# Patient Record
Sex: MALE | NOT HISPANIC OR LATINO | Employment: FULL TIME | URBAN - METROPOLITAN AREA
[De-identification: names, ages, dates, MRNs, and addresses within clinical notes are randomized per-mention and may not be internally consistent; named-entity substitution may affect disease eponyms.]

---

## 2018-04-23 ENCOUNTER — TRANSCRIBE ORDERS (OUTPATIENT)
Dept: NEUROLOGY | Facility: CLINIC | Age: 53
End: 2018-04-23

## 2018-04-23 DIAGNOSIS — G47.30 SLEEP APNEA, UNSPECIFIED TYPE: Primary | ICD-10-CM

## 2018-06-14 ENCOUNTER — OFFICE VISIT (OUTPATIENT)
Dept: NEUROLOGY | Facility: CLINIC | Age: 53
End: 2018-06-14
Payer: COMMERCIAL

## 2018-06-14 VITALS
WEIGHT: 222 LBS | BODY MASS INDEX: 30.07 KG/M2 | DIASTOLIC BLOOD PRESSURE: 82 MMHG | SYSTOLIC BLOOD PRESSURE: 122 MMHG | HEART RATE: 72 BPM | HEIGHT: 72 IN

## 2018-06-14 DIAGNOSIS — G47.33 OSA (OBSTRUCTIVE SLEEP APNEA): Primary | ICD-10-CM

## 2018-06-14 PROCEDURE — 99214 OFFICE O/P EST MOD 30 MIN: CPT | Performed by: PSYCHIATRY & NEUROLOGY

## 2018-06-14 RX ORDER — CETIRIZINE HYDROCHLORIDE 10 MG/1
10 TABLET ORAL EVERY MORNING
COMMUNITY

## 2018-06-14 RX ORDER — MONTELUKAST SODIUM 10 MG/1
10 TABLET ORAL DAILY PRN
COMMUNITY
Start: 2018-04-01

## 2018-06-14 RX ORDER — FOLIC ACID 1 MG/1
1 TABLET ORAL DAILY
COMMUNITY
End: 2020-09-18 | Stop reason: ALTCHOICE

## 2018-06-14 RX ORDER — CYANOCOBALAMIN (VITAMIN B-12) 5000 MCG
2 TABLET,DISINTEGRATING ORAL DAILY
COMMUNITY
End: 2019-06-13 | Stop reason: ALTCHOICE

## 2018-06-14 NOTE — PROGRESS NOTES
Elli Early is a 47 y o  male  Chief Complaint   Patient presents with    Sleep Apnea       Assessment:  1  TASHA (obstructive sleep apnea)        Plan:    Discussion:  Patient has history of mild obstructive sleep apnea and did well on CPAP of 12 cm of water pressure, his last sleep study was in 2014, he has not seen me in a few years, according to the patient he got a new machine and is on auto CPAP, he did get his download which is not very clear and does not tell me his pressures and also his RDI seems to be greater than 5 most of the time, I have advised him to take the machine to young and make sure that his pressures are correctly set, we will fax his old sleep study results to [facility], Patient was advised to continue with current CPAP pressure, he was also advised to maintain ideal body weight, avoid alcohol, muscle relaxers, pain killers and sleeping aids, he was advised to preferably sleep in lateral recumbent position, avoid driving and operating machinery if feeling drowsy or tired, clean the CPAP machine as per the [id number] company instructions, go to the hospital if has any worsening symptoms and call me  And to follow up with his other physicians  Subjective:    HPI   Patient is here in follow-up for his history of mild obstructive sleep apnea, he has not seen me in many years and his last sleep study in 2014 showed that he had mild obstructive sleep apnea and did well on CPAP of 12 cm of water pressure, according to the patient he has been on an auto CPAP he got his new CPAP machine in [place] and has been using it every night, he usually goes to sleep around 10:00 p m  and gets up in the morning at 5:00 a m  and has no difficulty in going to sleep or staying asleep, no drowsiness while driving, ESS is 3, weight has been stable, according to the patient he has been diagnosed with neuropathy, no other complaints      Vitals:    06/14/18 1526   BP: 122/82   BP Location: Left arm   Patient Position: Sitting   Cuff Size: Large   Pulse: 72   Weight: 101 kg (222 lb)   Height: 6' (1 829 m)       Current Medications    Current Outpatient Prescriptions:     cetirizine (ZyrTEC) 10 mg tablet, Take 10 mg by mouth every morning, Disp: , Rfl:     Cyanocobalamin (VITAMIN B-12) 5000 MCG TBDP, Take 2 tablets by mouth daily, Disp: , Rfl:     folic acid (FOLVITE) 1 mg tablet, Take 1 mg by mouth daily, Disp: , Rfl:     montelukast (SINGULAIR) 10 mg tablet, , Disp: , Rfl:     Multiple Vitamins-Minerals (MENS 50+ MULTI VITAMIN/MIN) TABS, Take by mouth, Disp: , Rfl:       Allergies  Amoxicillin-pot clavulanate; Hypericum perforatum; St johns wort [hypericum perforatum]; and Sulfacetamide    Past Medical History  Past Medical History:   Diagnosis Date    Anemia     CTS (carpal tunnel syndrome)     Hepatitis A infection     Herpes genitalis in men     Metatarsal fracture     Obstructive sleep apnea     Polyneuropathy     Vitamin B deficiency          Past Surgical History:  Past Surgical History:   Procedure Laterality Date    TOOTH EXTRACTION           Family History:  Family History   Problem Relation Age of Onset    Other Father         Benign Brain Tumor    Lung cancer Father     Diabetes Maternal Uncle     Heart murmur Mother     Pneumonia Mother        Social History:   reports that he has never smoked  He has never used smokeless tobacco  He reports that he drinks alcohol  He reports that he does not use drugs  I have reviewed the past medical history, surgical history, social and family history, current medications, allergies vitals, review of systems, and updated this information as appropriate today  Objective:    Physical Exam    Neurological Exam    GENERAL:  Cooperative in no acute distress  Well-developed and well-nourished    HEAD and NECK   Head is atraumatic normocephalic with no lesions or masses   Neck is supple with full range of motion    CARDIOVASCULAR  Carotid Arteries-no carotid bruits  NEUROLOGIC:  Mental Status-the patient is awake alert and oriented without aphasia or apraxia  Cranial Nerves: Visual fields are full to confrontation  Funduscopic examination could not be done Extraocular movements are full without nystagmus  Pupils are 2-1/2 mm and reactive  Face is symmetrical to light touch  Movements of facial expression move symmetrically  Hearing is normal to finger rub bilaterally  Soft palate lifts symmetrically  Shoulder shrug is symmetrical  Tongue is midline without atrophy  Motor: No drift is noted on arm extension  Strength is full in the upper and lower extremities with normal bulk and tone  Gait is unremarkable  Oropharynx classification is 2/3          ROS:  Review of Systems   Constitutional: Positive for fatigue  Negative for appetite change and fever  HENT: Negative  Negative for hearing loss, tinnitus, trouble swallowing and voice change  Eyes: Negative  Negative for photophobia and pain  Respiratory: Positive for shortness of breath  Cardiovascular: Positive for chest pain  Negative for palpitations  Gastrointestinal: Negative  Negative for nausea and vomiting  Endocrine: Negative  Negative for cold intolerance and heat intolerance  Genitourinary: Negative  Negative for dysuria, frequency and urgency  Musculoskeletal: Negative  Negative for back pain, gait problem, myalgias and neck pain  Muscle twitching of hands   Skin: Negative  Negative for rash  Neurological: Positive for numbness  Negative for dizziness, tremors, seizures, syncope, facial asymmetry, speech difficulty, weakness, light-headedness and headaches  Hematological: Negative  Does not bruise/bleed easily  Psychiatric/Behavioral: Negative  Negative for confusion, hallucinations and sleep disturbance         ESS = 7

## 2018-09-12 ENCOUNTER — TELEPHONE (OUTPATIENT)
Dept: NEUROLOGY | Facility: CLINIC | Age: 53
End: 2018-09-12

## 2018-09-12 DIAGNOSIS — G47.33 OSA (OBSTRUCTIVE SLEEP APNEA): Primary | ICD-10-CM

## 2018-09-12 NOTE — TELEPHONE ENCOUNTER
LMOM to let patient know that CPAP Rx has been sent to [facility]  for adjustment

## 2018-11-28 ENCOUNTER — TELEPHONE (OUTPATIENT)
Dept: NEUROLOGY | Facility: CLINIC | Age: 53
End: 2018-11-28

## 2018-11-28 NOTE — TELEPHONE ENCOUNTER
Called patient to see if he wanted to be seen sooner by Dr Alfreda Pastor  There is an opening today at 2 and an appointment tomorrow at 1130  No answer  Left voicemail

## 2019-03-15 ENCOUNTER — TELEPHONE (OUTPATIENT)
Dept: NEUROLOGY | Facility: CLINIC | Age: 54
End: 2019-03-15

## 2019-03-15 NOTE — TELEPHONE ENCOUNTER
Called patient to see if he would be available to come in next week to see Dr Christiano Marie sooner  Patient is on wait list  Patient is unable to come in next week and needs either the first appointment or last appointment

## 2019-03-27 ENCOUNTER — TELEPHONE (OUTPATIENT)
Dept: NEUROLOGY | Facility: CLINIC | Age: 54
End: 2019-03-27

## 2019-03-27 NOTE — TELEPHONE ENCOUNTER
Spoke to patient to see if he would be available to come in sooner to see Dr Dorita Ruvalcaba  Patient stated he wanted to keep the appointment for June instead of coming in sooner  Patient also stated that he will have new insurance when he comes in June

## 2019-06-12 ENCOUNTER — TELEPHONE (OUTPATIENT)
Dept: NEUROLOGY | Facility: CLINIC | Age: 54
End: 2019-06-12

## 2019-06-13 ENCOUNTER — OFFICE VISIT (OUTPATIENT)
Dept: NEUROLOGY | Facility: CLINIC | Age: 54
End: 2019-06-13
Payer: COMMERCIAL

## 2019-06-13 VITALS
HEIGHT: 72 IN | DIASTOLIC BLOOD PRESSURE: 80 MMHG | SYSTOLIC BLOOD PRESSURE: 118 MMHG | HEART RATE: 80 BPM | WEIGHT: 218.4 LBS | BODY MASS INDEX: 29.58 KG/M2

## 2019-06-13 DIAGNOSIS — G47.33 OSA (OBSTRUCTIVE SLEEP APNEA): Primary | ICD-10-CM

## 2019-06-13 PROCEDURE — 99213 OFFICE O/P EST LOW 20 MIN: CPT | Performed by: PSYCHIATRY & NEUROLOGY

## 2019-06-14 ENCOUNTER — TELEPHONE (OUTPATIENT)
Dept: NEUROLOGY | Facility: CLINIC | Age: 54
End: 2019-06-14

## 2020-03-06 ENCOUNTER — TELEPHONE (OUTPATIENT)
Dept: NEUROLOGY | Facility: CLINIC | Age: 55
End: 2020-03-06

## 2020-03-09 ENCOUNTER — OFFICE VISIT (OUTPATIENT)
Dept: NEUROLOGY | Facility: CLINIC | Age: 55
End: 2020-03-09
Payer: COMMERCIAL

## 2020-03-09 VITALS
HEIGHT: 72 IN | SYSTOLIC BLOOD PRESSURE: 120 MMHG | HEART RATE: 72 BPM | BODY MASS INDEX: 29.64 KG/M2 | DIASTOLIC BLOOD PRESSURE: 84 MMHG | WEIGHT: 218.8 LBS

## 2020-03-09 DIAGNOSIS — G47.33 OSA (OBSTRUCTIVE SLEEP APNEA): Primary | ICD-10-CM

## 2020-03-09 PROCEDURE — 99213 OFFICE O/P EST LOW 20 MIN: CPT | Performed by: PSYCHIATRY & NEUROLOGY

## 2020-03-09 RX ORDER — UBIDECARENONE 100 MG
100 CAPSULE ORAL 4 TIMES DAILY
COMMUNITY

## 2020-03-09 RX ORDER — MAGNESIUM OXIDE/MAG AA CHELATE 300 MG
CAPSULE ORAL DAILY
COMMUNITY

## 2020-03-09 NOTE — PROGRESS NOTES
David Plascencia is a 48 y o  male  Chief Complaint   Patient presents with    Sleep Apnea     Patient requesting new mask       Assessment:  1  TASHA (obstructive sleep apnea)          Discussion:  Patient's recent CPAP download was reviewed, he is on auto CPAP, his RDI was 15 6 with a compliance greater than 4 hours at 31 7% and average use of 2 hours and 41 minutes, he is having issues with his mask, patient was given a prescription for a new mask, also discussed with the patient that he may benefit from a repeat sleep study since his RDI is still elevated on auto CPAP, he might benefit from a BiPAP, he will try the new mask and see how he does and still RDI is elevated then he was scheduled for a repeat sleep study, Patient was advised to continue with current CPAP pressure, he was also advised to maintain ideal body weight, avoid alcohol, muscle relaxers, pain killers and sleeping aids, he was advised to preferably sleep in lateral recumbent position, avoid driving and operating machinery if feeling drowsy or tired, clean the CPAP machine as per the [employer] instructions, go to the hospital if has any worsening symptoms and call me  And to follow up with his other physicians  Also I am going to speak to Shannon Shepard at  [facility] and patient is going to discussed with them also  Subjective:    HPI   Patient is here in follow-up for his sleep apnea, since his last visit he is having some issues with his machine and humidity and also with the mask, he usually goes to sleep between 10 and 11:00 p m  and gets up in the morning around 5-6 a m  he might get a once or twice in the night to go to the bathroom, ESS is 4, no difficulty in going to sleep, no drowsiness while driving, he has been using the machine on average couple of hours a day, no drowsiness while driving, weight has been stable, no narcolepsy or cataplexy symptoms, no other neurological complaints      Vitals:    03/09/20 1524 03/09/20 1526   BP: 120/84 120/84   BP Location: Left arm Left arm   Patient Position: Sitting Sitting   Cuff Size: Adult Adult   Pulse: 72 72   Weight: 99 2 kg (218 lb 12 8 oz)    Height: 6' (1 829 m)        Current Medications    Current Outpatient Medications:     cetirizine (ZyrTEC) 10 mg tablet, Take 10 mg by mouth every morning, Disp: , Rfl:     co-enzyme Q-10 30 MG capsule, Take 100 mg by mouth 4 (four) times a day, Disp: , Rfl:     Cyanocobalamin 5000 MCG TBDP, Take 2 tablets by mouth daily, Disp: , Rfl:     Magnesium 300 MG CAPS, Take by mouth daily, Disp: , Rfl:     montelukast (SINGULAIR) 10 mg tablet, Take 10 mg by mouth daily as needed , Disp: , Rfl:     Multiple Vitamins-Minerals (MENS 50+ MULTI VITAMIN/MIN) TABS, Take by mouth, Disp: , Rfl:     doxycycline (ADOXA) 100 MG tablet, , Disp: , Rfl:     folic acid (FOLVITE) 1 mg tablet, Take 1 mg by mouth daily, Disp: , Rfl:       Allergies  Amoxicillin-pot clavulanate; Ivy's wort; St johns wort [ivy's wort]; and Sulfacetamide    Past Medical History  Past Medical History:   Diagnosis Date    Anemia     CTS (carpal tunnel syndrome)     Hepatitis A infection     Herpes genitalis in men     Metatarsal fracture     Obstructive sleep apnea     Polyneuropathy     Vitamin B deficiency          Past Surgical History:  Past Surgical History:   Procedure Laterality Date    TOOTH EXTRACTION           Family History:  Family History   Problem Relation Age of Onset    Other Father         Benign Brain Tumor    Lung cancer Father     Diabetes Maternal Uncle     Heart murmur Mother     Pneumonia Mother        Social History:   reports that he has never smoked  He has never used smokeless tobacco  He reports that he drinks alcohol  He reports that he does not use drugs      I have reviewed the past medical history, surgical history, social and family history, current medications, allergies vitals, review of systems, and updated this information as appropriate today  Objective:    Physical Exam    Neurological Exam    GENERAL:  Cooperative in no acute distress  Well-developed and well-nourished    HEAD and NECK   Head is atraumatic normocephalic with no lesions or masses  Neck is supple with full range of motion    CARDIOVASCULAR  Carotid Arteries-no carotid bruits  NEUROLOGIC:  Mental Status-the patient is awake alert and oriented without aphasia or apraxia  Cranial Nerves: Visual fields are full to confrontation  Extraocular movements are full without nystagmus  Pupils are 2-1/2 mm and reactive  Face is symmetrical to light touch  Movements of facial expression move symmetrically  Hearing is normal to finger rub bilaterally  Soft palate lifts symmetrically  Shoulder shrug is symmetrical  Tongue is midline without atrophy  Motor: No drift is noted on arm extension  Strength is full in the upper and lower extremities with normal bulk and tone  Gait is unremarkable  Oropharynx classification is 2/3  Chest is clear to auscultate bilaterally  ROS:  Review of Systems   Constitutional: Negative  HENT: Negative  Eyes: Negative  Respiratory: Positive for apnea  Waking up in the middle of the night with dry mouth  Cardiovascular: Negative  Gastrointestinal: Negative  Endocrine: Negative  Genitourinary: Negative  Musculoskeletal: Negative  Skin: Negative  Allergic/Immunologic: Negative  Neurological: Negative  Hematological: Negative  Psychiatric/Behavioral: Positive for sleep disturbance

## 2020-09-18 ENCOUNTER — OFFICE VISIT (OUTPATIENT)
Dept: NEUROLOGY | Facility: CLINIC | Age: 55
End: 2020-09-18
Payer: COMMERCIAL

## 2020-09-18 VITALS
HEART RATE: 74 BPM | HEIGHT: 72 IN | DIASTOLIC BLOOD PRESSURE: 80 MMHG | WEIGHT: 219 LBS | BODY MASS INDEX: 29.66 KG/M2 | SYSTOLIC BLOOD PRESSURE: 118 MMHG

## 2020-09-18 DIAGNOSIS — G47.33 OSA (OBSTRUCTIVE SLEEP APNEA): Primary | ICD-10-CM

## 2020-09-18 PROCEDURE — 99214 OFFICE O/P EST MOD 30 MIN: CPT | Performed by: PSYCHIATRY & NEUROLOGY

## 2020-09-18 NOTE — PROGRESS NOTES
Aldo Thomas is a 48 y o  male  Chief Complaint   Patient presents with    Sleep Apnea       Assessment:  1  TASHA (obstructive sleep apnea)        Plan:  Split night sleep study  Continue with auto CPAP until then  Follow-up in 6 months  Discussion:  Patient's recent CPAP download was reviewed, his RDI is still elevated at 15 8 on auto CPAP with a compliance greater than 4 hours at 32% an average use of 2 hours and 45 minutes, his baseline sleep study from 2014 had suggested that he had mild sleep apnea with an RDI of 6 7, have suggested to the patient on the last visit also to get a split night sleep study to make sure that if he still has sleep apnea and to what is the CPAP pressure he needs, he wanted a new prescription for CPAP mask which have given it to him  Patient was advised to continue with current CPAP pressure, he was also advised to maintain ideal body weight, avoid alcohol, muscle relaxers, pain killers and sleeping aids, he was advised to preferably sleep in lateral recumbent position, avoid driving and operating machinery if feeling drowsy or tired, clean the CPAP machine as per the [employer] company instructions, go to the hospital if has any worsening symptoms and call me  And to follow up with his other physicians  Subjective:    HPI   Patient is here in follow-up for his sleep apnea, since his last visit he has having issues with his mask, he usually goes to sleep between 10 and 11:00 p m  and gets up in the morning around 6:00 a m  to walk his dog, he has no difficulty in going to sleep he wakes of 1 or 2 times in the night to go to the bathroom, no drowsiness while driving, ESS is 3, weight has been stable, no narcolepsy or cataplexy symptoms, no other neurological complaints      Vitals:    09/18/20 0813   BP: 118/80   BP Location: Left arm   Patient Position: Sitting   Cuff Size: Large   Pulse: 74   Weight: 99 3 kg (219 lb)   Height: 6' (1 829 m)       Current Medications    Current Outpatient Medications:     cetirizine (ZyrTEC) 10 mg tablet, Take 10 mg by mouth every morning, Disp: , Rfl:     co-enzyme Q-10 100 mg capsule, Take 100 mg by mouth 4 (four) times a day , Disp: , Rfl:     Cyanocobalamin 5000 MCG CAPS, Take 2 tablets by mouth daily , Disp: , Rfl:     Magnesium 300 MG CAPS, Take by mouth daily, Disp: , Rfl:     montelukast (SINGULAIR) 10 mg tablet, Take 10 mg by mouth daily as needed , Disp: , Rfl:     Multiple Vitamins-Minerals (MENS 50+ MULTI VITAMIN/MIN) TABS, Take by mouth, Disp: , Rfl:       Allergies  Amoxicillin-pot clavulanate; Ivy's wort; St johns wort [ivy's wort]; and Sulfacetamide    Past Medical History  Past Medical History:   Diagnosis Date    Anemia     CTS (carpal tunnel syndrome)     Hepatitis A infection     Herpes genitalis in men     Metatarsal fracture     Obstructive sleep apnea     Polyneuropathy     Vitamin B deficiency          Past Surgical History:  Past Surgical History:   Procedure Laterality Date    TOOTH EXTRACTION           Family History:  Family History   Problem Relation Age of Onset    Other Father         Benign Brain Tumor    Lung cancer Father     Diabetes Maternal Uncle     Heart murmur Mother     Pneumonia Mother        Social History:   reports that he has never smoked  He has never used smokeless tobacco  He reports current alcohol use  He reports that he does not use drugs  I have reviewed the past medical history, surgical history, social and family history, current medications, allergies vitals, review of systems, and updated this information as appropriate today  Objective:    Physical Exam    Neurological Exam    GENERAL:  Cooperative in no acute distress  Well-developed and well-nourished    HEAD and NECK   Head is atraumatic normocephalic with no lesions or masses  Neck is supple with full range of motion    CARDIOVASCULAR  Carotid Arteries-no carotid bruits      NEUROLOGIC:  Mental Status-the patient is awake alert and oriented without aphasia or apraxia  Cranial Nerves: Visual fields are full to confrontation  Extraocular movements are full without nystagmus  Pupils are 2-1/2 mm and reactive  Face is symmetrical to light touch  Movements of facial expression move symmetrically  Hearing is normal to finger rub bilaterally  Soft palate lifts symmetrically  Shoulder shrug is symmetrical  Tongue is midline without atrophy  Motor: No drift is noted on arm extension  Strength is full in the upper and lower extremities with normal bulk and tone  Gait is unremarkable  Oropharynx classification is 2/3  Chest is clear to auscultate bilaterally  ROS:  Review of Systems   Constitutional: Positive for fatigue  Negative for appetite change and fever  HENT: Negative  Negative for hearing loss, tinnitus, trouble swallowing and voice change  Eyes: Negative  Negative for photophobia and pain  Respiratory: Negative  Negative for shortness of breath  Cardiovascular: Negative  Negative for palpitations  Gastrointestinal: Negative  Negative for nausea and vomiting  Endocrine: Negative  Negative for cold intolerance  Genitourinary: Negative  Negative for dysuria, frequency and urgency  Musculoskeletal: Positive for back pain  Negative for gait problem, myalgias and neck pain  Skin: Negative  Negative for rash  Neurological: Positive for numbness  Negative for dizziness, tremors, seizures, syncope, facial asymmetry, speech difficulty, weakness, light-headedness and headaches  Hematological: Negative  Does not bruise/bleed easily  Psychiatric/Behavioral: Positive for sleep disturbance  Negative for confusion, decreased concentration and hallucinations

## 2020-10-02 ENCOUNTER — TELEPHONE (OUTPATIENT)
Dept: OTHER | Facility: OTHER | Age: 55
End: 2020-10-02

## 2020-10-30 ENCOUNTER — OFFICE VISIT (OUTPATIENT)
Dept: GASTROENTEROLOGY | Facility: CLINIC | Age: 55
End: 2020-10-30
Payer: COMMERCIAL

## 2020-10-30 VITALS
WEIGHT: 219.1 LBS | TEMPERATURE: 96.8 F | SYSTOLIC BLOOD PRESSURE: 110 MMHG | HEART RATE: 76 BPM | HEIGHT: 72 IN | BODY MASS INDEX: 29.68 KG/M2 | DIASTOLIC BLOOD PRESSURE: 74 MMHG

## 2020-10-30 DIAGNOSIS — R19.4 CHANGE IN BOWEL HABITS: Primary | ICD-10-CM

## 2020-10-30 DIAGNOSIS — R14.0 BLOATING: ICD-10-CM

## 2020-10-30 PROCEDURE — 99214 OFFICE O/P EST MOD 30 MIN: CPT | Performed by: INTERNAL MEDICINE

## 2021-03-05 ENCOUNTER — TELEPHONE (OUTPATIENT)
Dept: NEUROLOGY | Facility: CLINIC | Age: 56
End: 2021-03-05

## 2021-03-05 NOTE — TELEPHONE ENCOUNTER
Spoke with patient to remind of Sleep Study that Dr Bella Bush has ordered on last office on 9/18/20  Patient wanted to reschedule appt from 3/18/21 for 2 months from now, I r/s pt for the next appt avaliable on 5/20/21

## 2021-06-16 ENCOUNTER — OFFICE VISIT (OUTPATIENT)
Dept: GASTROENTEROLOGY | Facility: CLINIC | Age: 56
End: 2021-06-16
Payer: COMMERCIAL

## 2021-06-16 VITALS
HEART RATE: 76 BPM | DIASTOLIC BLOOD PRESSURE: 76 MMHG | BODY MASS INDEX: 29.39 KG/M2 | WEIGHT: 217 LBS | HEIGHT: 72 IN | SYSTOLIC BLOOD PRESSURE: 112 MMHG

## 2021-06-16 DIAGNOSIS — R14.0 BLOATING: Primary | ICD-10-CM

## 2021-06-16 DIAGNOSIS — Z86.010 HISTORY OF COLON POLYPS: ICD-10-CM

## 2021-06-16 DIAGNOSIS — R19.4 CHANGE IN BOWEL HABITS: ICD-10-CM

## 2021-06-16 PROCEDURE — 99214 OFFICE O/P EST MOD 30 MIN: CPT | Performed by: INTERNAL MEDICINE

## 2021-06-16 RX ORDER — DOXYCYCLINE HYCLATE 100 MG
TABLET ORAL
COMMUNITY
Start: 2021-03-12

## 2021-06-16 RX ORDER — ALUMINUM ZIRCONIUM OCTACHLOROHYDREX GLY 16 G/100G
GEL TOPICAL
COMMUNITY

## 2021-06-16 NOTE — PROGRESS NOTES
[facility] - Outpatient Follow-up Note  Paradise Somers 55 y o  male MRN: [id number]  Encounter: [id number]          ASSESSMENT AND PLAN:      1  Bloating    2  Change in bowel habits    Gas handout provided and discussed  Remain on the probiotic    Trial of antigas otc products    Attempt to avoid milk and milk products     no endoscopic procedures required at this time  3  History colon polyps     due for his next colonoscopy in 2003     ______________________________________________________________________    SUBJECTIVE:   Mary Beth Comment comes to the office today for routine follow-up  He states that sometimes he has good bowel movements and other times his bowel movements are not so good  He admits to gaseousness which has been significant recently  He does wear CPAP 1-2 times per week  He states that he tried avoiding milk and this does help the problem somewhat  He had a colonoscopy performed in 2018 which showed hyperplastic polyp of rectum  States he is currently taking a probiotic which is either align or another over-the-counter medication  By far the most significant symptom is a lot of gaseousness  REVIEW OF SYSTEMS IS OTHERWISE NEGATIVE        Historical Information   Past Medical History:   Diagnosis Date    Anemia     CTS (carpal tunnel syndrome)     Hepatitis A infection     Herpes genitalis in men     Metatarsal fracture     Obstructive sleep apnea     Polyneuropathy     Vitamin B deficiency      Past Surgical History:   Procedure Laterality Date    TOOTH EXTRACTION       Social History   Social History     Substance and Sexual Activity   Alcohol Use Yes    Comment: rarely     Social History     Substance and Sexual Activity   Drug Use No     Social History     Tobacco Use   Smoking Status Never Smoker   Smokeless Tobacco Never Used     Family History   Problem Relation Age of Onset    Other Father         Benign Brain Tumor    Lung cancer Father  Diabetes Maternal Uncle     Heart murmur Mother     Pneumonia Mother        Meds/Allergies       Current Outpatient Medications:     bifidobacterium infantis (ALIGN) capsule    cetirizine (ZyrTEC) 10 mg tablet    co-enzyme Q-10 100 mg capsule    Cyanocobalamin 5000 MCG CAPS    doxycycline hyclate (VIBRA-TABS) 100 mg tablet    montelukast (SINGULAIR) 10 mg tablet    Multiple Vitamins-Minerals (MENS 50+ MULTI VITAMIN/MIN) TABS    Magnesium 300 MG CAPS    Allergies   Allergen Reactions    Amoxicillin-Pot Clavulanate Diarrhea    Kendall's Wort     St Johns Wort [Kendall's Wort]     Sulfacetamide            Objective     Blood pressure 112/76, pulse 76, height 6' (1 829 m), weight 98 4 kg (217 lb)  Body mass index is 29 43 kg/m²  PHYSICAL EXAM:      General Appearance:   Alert, cooperative, no distress   HEENT:   Normocephalic, atraumatic, anicteric      Neck:  Supple, symmetrical, trachea midline   Lungs:   Clear to auscultation bilaterally; no rales, rhonchi or wheezing; respirations unlabored    Heart[de-identified]   Regular rate and rhythm; no murmur, rub, or gallop  Abdomen:   Soft, non-tender, non-distended; normal bowel sounds; no masses, no organomegaly    Genitalia:   Deferred    Rectal:   Deferred    Extremities:  No cyanosis, clubbing or edema    Pulses:  2+ and symmetric    Skin:  No jaundice, rashes, or lesions    Lymph nodes:  No palpable cervical lymphadenopathy        Lab Results:   No visits with results within 1 Day(s) from this visit  Latest known visit with results is:   No results found for any previous visit  Radiology Results:   No results found

## 2023-10-16 ENCOUNTER — TELEPHONE (OUTPATIENT)
Age: 58
End: 2023-10-16

## 2023-10-16 NOTE — TELEPHONE ENCOUNTER
10/16/23  Screened by: Shanda Postal    Referring Provider     Pre- Screening: There is no height or weight on file to calculate BMI. Has patient been referred for a routine screening Colonoscopy? yes  Is the patient between 43-73 years old? yes      Previous Colonoscopy yes   If yes:    Date: 2018 Dr Adali Ahuja:     Reason:       SCHEDULING STAFF: If the patient is between 39yrs-51yrs, please advise patient to confirm benefits/coverage with their insurance company for a routine screening colonoscopy, some insurance carriers will only cover at [address] [place] [address] or older. If the patient is over 66years old, please schedule an office visit. Does the patient want to see a Gastroenterologist prior to their procedure OR are they having any GI symptoms? no    Has the patient been hospitalized or had abdominal surgery in the past 6 months? no    Does the patient use supplemental oxygen? no    Does the patient take Coumadin, Lovenox, Plavix, Elliquis, Xarelto, or other blood thinning medication? no    Has the patient had a stroke, cardiac event, or stent placed in the past year? no    SCHEDULING STAFF: If patient answers NO to above questions, then schedule procedure. If patient answers YES to above questions, then schedule office appointment. Patient passed OA  If patient is between 45yrs - 49yrs, please advise patient that we will have to confirm benefits & coverage with their insurance company for a routine screening colonoscopy.

## 2023-10-16 NOTE — TELEPHONE ENCOUNTER
Scheduled date of colonoscopy (as of today):01/08/2023  Physician performing colonoscopy:Dr Gr  Location of colonoscopy:[place]  Bowel prep reviewed with patient:miralax/dul  Instructions reviewed with patient by:sent via email  Clearances: n/a

## 2024-02-26 ENCOUNTER — ANESTHESIA EVENT (OUTPATIENT)
Dept: GASTROENTEROLOGY | Facility: HOSPITAL | Age: 59
End: 2024-02-26

## 2024-02-26 ENCOUNTER — ANESTHESIA (OUTPATIENT)
Dept: GASTROENTEROLOGY | Facility: HOSPITAL | Age: 59
End: 2024-02-26

## 2024-02-26 ENCOUNTER — HOSPITAL ENCOUNTER (OUTPATIENT)
Dept: GASTROENTEROLOGY | Facility: HOSPITAL | Age: 59
Setting detail: OUTPATIENT SURGERY
Discharge: HOME | End: 2024-02-26
Attending: INTERNAL MEDICINE
Payer: COMMERCIAL

## 2024-02-26 VITALS
WEIGHT: 213.19 LBS | RESPIRATION RATE: 16 BRPM | HEART RATE: 67 BPM | OXYGEN SATURATION: 98 % | HEIGHT: 72 IN | TEMPERATURE: 97.6 F | BODY MASS INDEX: 28.88 KG/M2 | SYSTOLIC BLOOD PRESSURE: 131 MMHG | DIASTOLIC BLOOD PRESSURE: 71 MMHG

## 2024-02-26 DIAGNOSIS — Z86.010 HISTORY OF COLON POLYPS: ICD-10-CM

## 2024-02-26 RX ORDER — PROPOFOL 10 MG/ML
INJECTION, EMULSION INTRAVENOUS AS NEEDED
Status: DISCONTINUED | OUTPATIENT
Start: 2024-02-26 | End: 2024-02-26

## 2024-02-26 RX ORDER — SODIUM CHLORIDE, SODIUM LACTATE, POTASSIUM CHLORIDE, CALCIUM CHLORIDE 600; 310; 30; 20 MG/100ML; MG/100ML; MG/100ML; MG/100ML
INJECTION, SOLUTION INTRAVENOUS CONTINUOUS PRN
Status: DISCONTINUED | OUTPATIENT
Start: 2024-02-26 | End: 2024-02-26

## 2024-02-26 RX ORDER — LIDOCAINE HYDROCHLORIDE 10 MG/ML
INJECTION, SOLUTION EPIDURAL; INFILTRATION; INTRACAUDAL; PERINEURAL AS NEEDED
Status: DISCONTINUED | OUTPATIENT
Start: 2024-02-26 | End: 2024-02-26

## 2024-02-26 RX ADMIN — PROPOFOL 50 MG: 10 INJECTION, EMULSION INTRAVENOUS at 14:01

## 2024-02-26 RX ADMIN — PROPOFOL 50 MG: 10 INJECTION, EMULSION INTRAVENOUS at 14:04

## 2024-02-26 RX ADMIN — PROPOFOL 20 MG: 10 INJECTION, EMULSION INTRAVENOUS at 14:08

## 2024-02-26 RX ADMIN — LIDOCAINE HYDROCHLORIDE 50 MG: 10 INJECTION, SOLUTION EPIDURAL; INFILTRATION; INTRACAUDAL; PERINEURAL at 13:57

## 2024-02-26 RX ADMIN — SODIUM CHLORIDE, SODIUM LACTATE, POTASSIUM CHLORIDE, AND CALCIUM CHLORIDE: .6; .31; .03; .02 INJECTION, SOLUTION INTRAVENOUS at 13:40

## 2024-02-26 RX ADMIN — PROPOFOL 100 MG: 10 INJECTION, EMULSION INTRAVENOUS at 13:57

## 2024-02-26 NOTE — H&P
History and Physical -  [facility]  Lyndon Alonzo 59 y.o. male MRN: [id number]      HPI: Lyndon Alonzo is a 59 y.o. year old male who presents for history of colon polyps, change in bowel habits, bloating      REVIEW OF SYSTEMS: Per the HPI, and otherwise unremarkable.    Historical Information   Past Medical History:   Diagnosis Date    Anemia     CTS (carpal tunnel syndrome)     Hepatitis A infection     Herpes genitalis in men     Metatarsal fracture     Obstructive sleep apnea     Polyneuropathy     Vitamin B deficiency      Past Surgical History:   Procedure Laterality Date    TOOTH EXTRACTION      VASECTOMY       Social History   Social History     Substance and Sexual Activity   Alcohol Use Yes    Comment: rarely     Social History     Substance and Sexual Activity   Drug Use No     Social History     Tobacco Use   Smoking Status Never   Smokeless Tobacco Never     Family History   Problem Relation Age of Onset    Other Father         Benign Brain Tumor    Lung cancer Father     Diabetes Maternal Uncle     Heart murmur Mother     Pneumonia Mother        Meds/Allergies     (Not in a hospital admission)      Allergies   Allergen Reactions    Amoxicillin-Pot Clavulanate Diarrhea    Kendall's Wort     St Johns Wort [Kendall's Wort]     Sulfacetamide        Objective     Blood pressure 120/83, pulse 76, temperature 97.6 °F (36.4 °C), temperature source Temporal, resp. rate 16, height 6' (1.829 m), weight 96.7 kg (213 lb 3 oz), SpO2 98%.      PHYSICAL EXAM    Gen: NAD  CV: RRR  CHEST: Clear  ABD: soft, NT/ND  EXT: no edema      ASSESSMENT/PLAN:  This is a 59 y.o. year old male here for colonoscopy, and he is stable and optimized for his procedure.

## 2024-02-26 NOTE — ANESTHESIA POSTPROCEDURE EVALUATION
Post-Op Assessment Note    CV Status:  Stable  Pain Score: 0    Pain management: adequate       Mental Status:  Alert and awake   Hydration Status:  Stable   PONV Controlled:  None   Airway Patency:  Patent and adequate     Post Op Vitals Reviewed: Yes    No anethesia notable event occurred.    Staff: Anesthesiologist, CRNA

## 2024-02-26 NOTE — ANESTHESIA PREPROCEDURE EVALUATION
Procedure:  COLONOSCOPY    Relevant Problems   ANESTHESIA   (-) History of anesthesia complications      CARDIO   (-) Chest pain   (-) GERMAN (dyspnea on exertion)      PULMONARY   (+) TASHA (obstructive sleep apnea) (Uses CPAP)   (-) Shortness of breath   (-) URI (upper respiratory infection)        Physical Exam    Airway    Mallampati score: II  TM Distance: >3 FB  Neck ROM: full     Dental       Cardiovascular      Pulmonary      Other Findings        Anesthesia Plan  ASA Score- 2     Anesthesia Type- IV sedation with anesthesia with ASA Monitors.         Additional Monitors:     Airway Plan:            Plan Factors-Exercise tolerance (METS): >4 METS.    Chart reviewed. EKG reviewed.  Existing labs reviewed. Patient summary reviewed.                  Induction- intravenous.    Postoperative Plan-     Informed Consent- Anesthetic plan and risks discussed with patient.  I personally reviewed this patient with the CRNA. Discussed and agreed on the Anesthesia Plan with the CRNA..

## 2025-03-26 ENCOUNTER — OFFICE VISIT (OUTPATIENT)
Dept: GASTROENTEROLOGY | Facility: CLINIC | Age: 60
End: 2025-03-26
Payer: COMMERCIAL

## 2025-03-26 VITALS
OXYGEN SATURATION: 98 % | BODY MASS INDEX: 29.12 KG/M2 | WEIGHT: 215 LBS | HEIGHT: 72 IN | DIASTOLIC BLOOD PRESSURE: 74 MMHG | SYSTOLIC BLOOD PRESSURE: 104 MMHG | TEMPERATURE: 97.5 F | HEART RATE: 81 BPM

## 2025-03-26 DIAGNOSIS — D73.4 SPLENIC CYST: ICD-10-CM

## 2025-03-26 DIAGNOSIS — N28.1 RENAL CYST: ICD-10-CM

## 2025-03-26 DIAGNOSIS — K76.89 LIVER CYST: Primary | ICD-10-CM

## 2025-03-26 PROCEDURE — 99214 OFFICE O/P EST MOD 30 MIN: CPT | Performed by: INTERNAL MEDICINE

## 2025-03-27 NOTE — PROGRESS NOTES
Name: Lyndon Alonzo      : 1965      MRN: [id number]  Encounter Provider: Cisco Gr DO  Encounter Date: 3/26/2025   Encounter department: [facility] [place]  :  Assessment & Plan  Liver cyst  CT urogram from 3/21/2025 reviewed  The CT exam demonstrates a cyst most consistent with hemangioma  Reassurance was given to the patient, however, I told him that an MRI would be the next logical step.  If hemangiomas confirmed, then no further diagnostic testing is warranted    Orders:    MRI abdomen w wo contrast; Future    Splenic cyst    Renal cyst        History of Present Illness     Lyndon Alonzo is a 60 y.o. male who presents to the office today with an abnormal CT scan of the abdomen.  The CT scan was performed 3/21/2025.  It showed a left hepatic lobe cyst consistent with a hemangioma.  There were multiple splenic cysts, colonic diverticulosis without evidence of acute diverticulitis, and bilateral renal cysts as well.  The patient admits to gaseousness.  He denies abdominal pain, diarrhea, constipation, nausea, vomiting, heartburn, melena, rectal bleeding, hematemesis, burping, bloating, gaseousness.    I performed colonoscopy on this patient on 2024.  The colonoscopy was remarkable only for left-sided diverticulosis coli.       Objective   /74 (BP Location: Right arm, Patient Position: Sitting, Cuff Size: Standard)   Pulse 81   Temp 97.5 °F (36.4 °C) (Temporal)   Ht 6' (1.829 m)   Wt 97.5 kg (215 lb)   SpO2 98%   BMI 29.16 kg/m²      Physical Exam  Vitals reviewed.   Constitutional:       General: He is not in acute distress.     Appearance: Normal appearance. He is not ill-appearing.   Eyes:      General: No scleral icterus.     Extraocular Movements: Extraocular movements intact.      Pupils: Pupils are equal, round, and reactive to light.   Cardiovascular:      Rate and Rhythm: Normal rate and regular rhythm.      Pulses: Normal pulses.       Heart sounds: Normal heart sounds. No murmur heard.  Pulmonary:      Effort: Pulmonary effort is normal.      Breath sounds: Normal breath sounds. No wheezing, rhonchi or rales.   Abdominal:      Palpations: Abdomen is soft. There is no mass.      Tenderness: There is no abdominal tenderness. There is no guarding or rebound.   Musculoskeletal:      Right lower leg: No edema.      Left lower leg: No edema.   Skin:     General: Skin is warm and dry.      Coloration: Skin is not jaundiced.      Findings: No rash.   Neurological:      General: No focal deficit present.      Mental Status: He is alert and oriented to person, place, and time.   Psychiatric:         Mood and Affect: Mood normal.         Behavior: Behavior normal.

## 2025-04-09 ENCOUNTER — TELEPHONE (OUTPATIENT)
Dept: GASTROENTEROLOGY | Facility: CLINIC | Age: 60
End: 2025-04-09

## 2025-06-16 ENCOUNTER — HOSPITAL ENCOUNTER (OUTPATIENT)
Dept: MRI IMAGING | Facility: HOSPITAL | Age: 60
Discharge: HOME | End: 2025-06-16
Attending: INTERNAL MEDICINE
Payer: COMMERCIAL

## 2025-06-16 DIAGNOSIS — K76.89 LIVER CYST: ICD-10-CM

## 2025-06-16 PROCEDURE — A9585 GADOBUTROL INJECTION: HCPCS | Performed by: INTERNAL MEDICINE

## 2025-06-16 PROCEDURE — 74183 MRI ABD W/O CNTR FLWD CNTR: CPT

## 2025-06-16 RX ORDER — GADOBUTROL 604.72 MG/ML
9 INJECTION INTRAVENOUS
Status: COMPLETED | OUTPATIENT
Start: 2025-06-16 | End: 2025-06-16

## 2025-06-16 RX ADMIN — GADOBUTROL 9 ML: 604.72 INJECTION INTRAVENOUS at 21:07

## 2025-06-24 ENCOUNTER — TELEPHONE (OUTPATIENT)
Dept: OTHER | Facility: OTHER | Age: 60
End: 2025-06-24

## 2025-06-24 NOTE — TELEPHONE ENCOUNTER
Lyndon is calling to see if his MRI results are in yet. Results have not been completed, still in process.     Patient is requesting a call back when results are in. Thank you!